# Patient Record
Sex: MALE | URBAN - METROPOLITAN AREA
[De-identification: names, ages, dates, MRNs, and addresses within clinical notes are randomized per-mention and may not be internally consistent; named-entity substitution may affect disease eponyms.]

---

## 2020-06-05 ENCOUNTER — LAB OUTSIDE AN ENCOUNTER (OUTPATIENT)
Dept: URBAN - METROPOLITAN AREA TELEHEALTH 2 | Facility: TELEHEALTH | Age: 40
End: 2020-06-05

## 2020-06-05 ENCOUNTER — OFFICE VISIT (OUTPATIENT)
Dept: URBAN - METROPOLITAN AREA TELEHEALTH 2 | Facility: TELEHEALTH | Age: 40
End: 2020-06-05
Payer: COMMERCIAL

## 2020-06-05 ENCOUNTER — TELEPHONE ENCOUNTER (OUTPATIENT)
Dept: URBAN - METROPOLITAN AREA CLINIC 78 | Facility: CLINIC | Age: 40
End: 2020-06-05

## 2020-06-05 DIAGNOSIS — K58.9 IBS (IRRITABLE BOWEL SYNDROME) DIARRHEA PREDOMINANT: ICD-10-CM

## 2020-06-05 DIAGNOSIS — R10.32 LEFT LOWER QUADRANT PAIN: ICD-10-CM

## 2020-06-05 DIAGNOSIS — K21.9 GERD (GASTROESOPHAGEAL REFLUX DISEASE): ICD-10-CM

## 2020-06-05 DIAGNOSIS — E78.5 HYPERLIPIDEMIA: ICD-10-CM

## 2020-06-05 PROBLEM — 275299008 BELCHING SYMPTOM: Status: ACTIVE | Noted: 2020-06-05

## 2020-06-05 PROBLEM — 301716002 LEFT LOWER QUADRANT PAIN: Status: ACTIVE | Noted: 2020-06-05

## 2020-06-05 PROBLEM — 50219008 VOICE HOARSENESS: Status: ACTIVE | Noted: 2020-06-05

## 2020-06-05 PROBLEM — 271840007 ABNORMAL FECES: Status: ACTIVE | Noted: 2020-06-05

## 2020-06-05 PROCEDURE — 99204 OFFICE O/P NEW MOD 45 MIN: CPT | Performed by: INTERNAL MEDICINE

## 2020-06-05 PROCEDURE — 99244 OFF/OP CNSLTJ NEW/EST MOD 40: CPT | Performed by: INTERNAL MEDICINE

## 2020-06-05 RX ORDER — SIMVASTATIN 40 MG/1
1 TABLET IN THE EVENING TABLET, FILM COATED ORAL ONCE A DAY
Status: ACTIVE | COMMUNITY

## 2020-06-05 RX ORDER — SODIUM, POTASSIUM,MAG SULFATES 17.5-3.13G
177ML SOLUTION, RECONSTITUTED, ORAL ORAL
Qty: 177 MILLILITER | Refills: 0 | OUTPATIENT
Start: 2020-06-05 | End: 2020-06-06

## 2020-06-05 RX ORDER — OMEPRAZOLE 40 MG/1
1 CAPSULE 30 MINUTES BEFORE MORNING MEAL CAPSULE, DELAYED RELEASE ORAL ONCE A DAY
Status: ACTIVE | COMMUNITY

## 2020-06-05 NOTE — HPI-TODAY'S VISIT:
Patient is a 39-year-old male referred by Dr. Jignesh Bermudez, for group of GI issues.  Patient has an identical twin reportedly who he references for comparison.  He states he was his baseline state of health up until March 2020.  Baseline state was hoarseness of voice and bowel habits suggestive of irritable bowel syndrome loose stool predominant.  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  He has lost 18 pounds since March but primarily because he is stopped working out and has changed his diet.  He is not concerned about the weight loss. He expresses us feeling of mortality (I feel like I am  going to die).  He has been tested for CoViD 19 times 4 and reportedly all of them were negative.  He also had Kopit 19 serologies which were negative. Patient had an episode of chest pain radiating down his left arm for which he went to [facility] in reportedly underwent an unrevealing cardiac cath. He is scheduled to get an MRI MRA of the brain on Tuesday and follow-up with the neurologist who is is seen for pains and tingling sensation in his arms and legs and feet. Patient also has seen ENT and was diagnosed with reflux as cause of sore throat. He is calling primarily because he is concerned that his reflux is getting worse and that he may have esophageal cancer.  He does have sensation of feeling something is stuck there and burning sensation.  He is on omeprazole currently 40 mg half an hour before breakfast. His primary care physician thinks that he has superimposed anxiety disorder and has initiated him on BuSpar which helps take the edge off. He is concerned about the change in the stool color, yellow or light-colored stools 2 bowel movements a day Denies melena hematochezia fever chills Denies family history of IBD colon cancer or polyps His identical twin has similar complaints minus the anxiety disorder. On 5/5/2020 labs at that time showed normal white count hemoglobin was 16.4 with a hematocrit of 46.8 liver enzymes were normal CMP was normal with a creatinine of 1.0 Patient is a former tobacco user user quit 3 years ago Denies recreational drug use or street drugs He is at home [employer]

## 2020-06-08 ENCOUNTER — TELEPHONE ENCOUNTER (OUTPATIENT)
Dept: URBAN - METROPOLITAN AREA CLINIC 78 | Facility: CLINIC | Age: 40
End: 2020-06-08

## 2020-06-16 ENCOUNTER — OFFICE VISIT (OUTPATIENT)
Dept: URBAN - METROPOLITAN AREA SURGERY CENTER 15 | Facility: SURGERY CENTER | Age: 40
End: 2020-06-16

## 2020-06-19 ENCOUNTER — TELEPHONE ENCOUNTER (OUTPATIENT)
Dept: URBAN - METROPOLITAN AREA CLINIC 78 | Facility: CLINIC | Age: 40
End: 2020-06-19

## 2020-06-24 ENCOUNTER — TELEPHONE ENCOUNTER (OUTPATIENT)
Dept: URBAN - METROPOLITAN AREA CLINIC 78 | Facility: CLINIC | Age: 40
End: 2020-06-24

## 2020-06-26 ENCOUNTER — TELEPHONE ENCOUNTER (OUTPATIENT)
Dept: URBAN - METROPOLITAN AREA CLINIC 92 | Facility: CLINIC | Age: 40
End: 2020-06-26

## 2020-06-30 ENCOUNTER — OFFICE VISIT (OUTPATIENT)
Dept: URBAN - METROPOLITAN AREA SURGERY CENTER 15 | Facility: SURGERY CENTER | Age: 40
End: 2020-06-30
Payer: COMMERCIAL

## 2020-06-30 ENCOUNTER — CLAIMS CREATED FROM THE CLAIM WINDOW (OUTPATIENT)
Dept: URBAN - METROPOLITAN AREA CLINIC 4 | Facility: CLINIC | Age: 40
End: 2020-06-30
Payer: COMMERCIAL

## 2020-06-30 DIAGNOSIS — K21.9 ACID REFLUX: ICD-10-CM

## 2020-06-30 DIAGNOSIS — D50.9 IRON DEFICIENCY ANEMIA, UNSPECIFIED: ICD-10-CM

## 2020-06-30 DIAGNOSIS — R11.2 NAUSEA WITH VOMITING, UNSPECIFIED: ICD-10-CM

## 2020-06-30 DIAGNOSIS — R19.4 ALTERED BOWEL HABITS: ICD-10-CM

## 2020-06-30 DIAGNOSIS — R10.9 UNSPECIFIED ABDOMINAL PAIN: ICD-10-CM

## 2020-06-30 PROCEDURE — 45378 DIAGNOSTIC COLONOSCOPY: CPT | Performed by: INTERNAL MEDICINE

## 2020-06-30 PROCEDURE — 43239 EGD BIOPSY SINGLE/MULTIPLE: CPT | Performed by: INTERNAL MEDICINE

## 2020-06-30 PROCEDURE — G9937 DIG OR SURV COLSCO: HCPCS | Performed by: INTERNAL MEDICINE

## 2020-06-30 PROCEDURE — 88305 TISSUE EXAM BY PATHOLOGIST: CPT | Performed by: PATHOLOGY

## 2020-06-30 PROCEDURE — G8907 PT DOC NO EVENTS ON DISCHARG: HCPCS | Performed by: INTERNAL MEDICINE

## 2020-06-30 RX ORDER — SIMVASTATIN 40 MG/1
1 TABLET IN THE EVENING TABLET, FILM COATED ORAL ONCE A DAY
Status: ACTIVE | COMMUNITY

## 2020-06-30 RX ORDER — OMEPRAZOLE 40 MG/1
1 CAPSULE 30 MINUTES BEFORE MORNING MEAL CAPSULE, DELAYED RELEASE ORAL ONCE A DAY
Status: ACTIVE | COMMUNITY

## 2020-07-13 ENCOUNTER — TELEPHONE ENCOUNTER (OUTPATIENT)
Dept: URBAN - METROPOLITAN AREA CLINIC 92 | Facility: CLINIC | Age: 40
End: 2020-07-13

## 2020-07-31 ENCOUNTER — OFFICE VISIT (OUTPATIENT)
Dept: URBAN - METROPOLITAN AREA TELEHEALTH 2 | Facility: TELEHEALTH | Age: 40
End: 2020-07-31
Payer: COMMERCIAL

## 2020-07-31 DIAGNOSIS — R19.5 CHANGE IN STOOL: ICD-10-CM

## 2020-07-31 DIAGNOSIS — K21.9 GERD (GASTROESOPHAGEAL REFLUX DISEASE): ICD-10-CM

## 2020-07-31 DIAGNOSIS — R10.32 LEFT LOWER QUADRANT PAIN: ICD-10-CM

## 2020-07-31 DIAGNOSIS — F45.8 GLOBUS SENSATION: ICD-10-CM

## 2020-07-31 PROCEDURE — 99213 OFFICE O/P EST LOW 20 MIN: CPT | Performed by: INTERNAL MEDICINE

## 2020-07-31 RX ORDER — OMEPRAZOLE 40 MG/1
1 CAPSULE 30 MINUTES BEFORE MORNING MEAL CAPSULE, DELAYED RELEASE ORAL ONCE A DAY
Status: ACTIVE | COMMUNITY

## 2020-07-31 RX ORDER — SIMVASTATIN 40 MG/1
1 TABLET IN THE EVENING TABLET, FILM COATED ORAL ONCE A DAY
Status: ACTIVE | COMMUNITY

## 2020-07-31 NOTE — HPI-TODAY'S VISIT:
Patient states that he has overall significant improvement in GI symptoms  He does however wonder off to discuss other symptoms involving his jaw  He stated that his doctors including PCP /Neurologist/ Cardiologist/ and Psychiatrist think that he had CoVID 19 even though he tested neg several times because of the SOB breath  and reflux and chest pain ( requiring carrdiac cath ) He has been reffere to [facility] for further evaluation and states that because [place] is busy with cases , patients with suspected CoVID 19 like himself are required to wait  he experienced despite Patient is here in a follow up after the recent EGD and a colonoscopy. The findings of the procedure and the pathology were discussed with the patient. All questions were answered to the patient's satisfaction.

## 2021-11-12 ENCOUNTER — OFFICE VISIT (OUTPATIENT)
Dept: URBAN - METROPOLITAN AREA CLINIC 25 | Facility: CLINIC | Age: 41
End: 2021-11-12

## 2021-12-10 ENCOUNTER — TELEPHONE ENCOUNTER (OUTPATIENT)
Dept: URBAN - METROPOLITAN AREA CLINIC 92 | Facility: CLINIC | Age: 41
End: 2021-12-10

## 2021-12-29 PROBLEM — 10743008 IRRITABLE BOWEL SYNDROME: Status: ACTIVE | Noted: 2020-06-05

## 2021-12-29 PROBLEM — 267103008 GLOBUS SENSATION: Status: ACTIVE | Noted: 2020-06-05

## 2021-12-29 PROBLEM — 247808006 ANXIETY ABOUT HEALTH: Status: ACTIVE | Noted: 2021-12-29

## 2021-12-29 PROBLEM — 386033004 NEUROPATHY: Status: ACTIVE | Noted: 2020-06-05

## 2021-12-29 PROBLEM — 197480006 ANXIETY DISORDER: Status: ACTIVE | Noted: 2020-06-05

## 2021-12-30 ENCOUNTER — DASHBOARD ENCOUNTERS (OUTPATIENT)
Age: 41
End: 2021-12-30

## 2021-12-30 ENCOUNTER — OFFICE VISIT (OUTPATIENT)
Dept: URBAN - METROPOLITAN AREA CLINIC 84 | Facility: CLINIC | Age: 41
End: 2021-12-30
Payer: COMMERCIAL

## 2021-12-30 ENCOUNTER — TELEPHONE ENCOUNTER (OUTPATIENT)
Dept: URBAN - METROPOLITAN AREA CLINIC 92 | Facility: CLINIC | Age: 41
End: 2021-12-30

## 2021-12-30 DIAGNOSIS — R97.0 ELEVATED CEA: ICD-10-CM

## 2021-12-30 DIAGNOSIS — K21.9 GERD (GASTROESOPHAGEAL REFLUX DISEASE): ICD-10-CM

## 2021-12-30 DIAGNOSIS — E78.5 HYPERLIPIDEMIA: ICD-10-CM

## 2021-12-30 PROBLEM — 235595009 GASTROESOPHAGEAL REFLUX DISEASE: Status: ACTIVE | Noted: 2020-06-05

## 2021-12-30 PROBLEM — 445201008: Status: ACTIVE | Noted: 2021-12-30

## 2021-12-30 PROBLEM — 55822004 HYPERLIPIDEMIA: Status: ACTIVE | Noted: 2020-06-05

## 2021-12-30 PROCEDURE — 99213 OFFICE O/P EST LOW 20 MIN: CPT | Performed by: INTERNAL MEDICINE

## 2021-12-30 RX ORDER — SIMVASTATIN 40 MG/1
1 TABLET IN THE EVENING TABLET, FILM COATED ORAL ONCE A DAY
Status: ON HOLD | COMMUNITY

## 2021-12-30 RX ORDER — OMEPRAZOLE 40 MG/1
1 CAPSULE 30 MINUTES BEFORE MORNING MEAL CAPSULE, DELAYED RELEASE ORAL ONCE A DAY
Status: ON HOLD | COMMUNITY

## 2021-12-30 NOTE — HPI-TODAY'S VISIT:
December 2021 visit:  Telemedicine visit.  EGD and colonoscopy in June 2020 ( dr mccoy)  revealed mildly erythematous stomach otherwise normal exam, colonoscopy was completely normal.  Biopsy from the duodenum was benign, gastric biopsy did not reveal any H. pylori infection. 1 grand parent had colon cancer in 30s.  right now not on PPI. CEA level elevated with pcp.  nonsmoker. saw oncology ( perhaps [facility] provider) - had CT chest and abdomen imaging which was done. as per pt was normal.  chronic abdominal issues since 2020 which he attributes to covid sequalae. irregular bowels. no rectal bleeding.

## 2021-12-30 NOTE — HPI-OTHER HISTORIES
July 2020:  Patient states that he has overall significant improvement in GI symptoms  He does however wonder off to discuss other symptoms involving his jaw  He stated that his doctors including PCP /Neurologist/ Cardiologist/ and Psychiatrist think that he had CoVID 19 even though he tested neg several times because of the SOB breath  and reflux and chest pain ( requiring carrdiac cath ) He has been reffere to [facility] for further evaluation and states that because [place] is busy with cases , patients with suspected CoVID 19 like himself are required to wait  he experienced despite Patient is here in a follow up after the recent EGD and a colonoscopy. The findings of the procedure and the pathology were discussed with the patient. All questions were answered to the patient's satisfaction.